# Patient Record
Sex: MALE | Race: WHITE | Employment: FULL TIME | URBAN - METROPOLITAN AREA
[De-identification: names, ages, dates, MRNs, and addresses within clinical notes are randomized per-mention and may not be internally consistent; named-entity substitution may affect disease eponyms.]

---

## 2018-01-13 ENCOUNTER — NURSE ONLY (OUTPATIENT)
Dept: FAMILY MEDICINE CLINIC | Facility: CLINIC | Age: 53
End: 2018-01-13

## 2018-01-13 VITALS
BODY MASS INDEX: 26.51 KG/M2 | HEART RATE: 72 BPM | TEMPERATURE: 98 F | HEIGHT: 73 IN | RESPIRATION RATE: 16 BRPM | OXYGEN SATURATION: 98 % | WEIGHT: 200 LBS

## 2018-01-13 DIAGNOSIS — S61.451A CAT BITE OF RIGHT HAND, INITIAL ENCOUNTER: Primary | ICD-10-CM
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
## 2018-01-13 DIAGNOSIS — Z23 NEED FOR VACCINATION: ICD-10-CM

## 2018-01-13 PROCEDURE — 90471 IMMUNIZATION ADMIN: CPT | Performed by: NURSE PRACTITIONER

## 2018-01-13 PROCEDURE — 90715 TDAP VACCINE 7 YRS/> IM: CPT | Performed by: NURSE PRACTITIONER

## 2018-01-13 PROCEDURE — 99202 OFFICE O/P NEW SF 15 MIN: CPT | Performed by: NURSE PRACTITIONER

## 2018-01-13 RX ORDER — AMOXICILLIN AND CLAVULANATE POTASSIUM 875; 125 MG/1; MG/1
1 TABLET, FILM COATED ORAL 2 TIMES DAILY
Qty: 14 TABLET | Refills: 0 | Status: SHIPPED | OUTPATIENT
Start: 2018-01-13 | End: 2018-01-20

## 2018-01-13 RX ORDER — GLIMEPIRIDE 2 MG/1
2 TABLET ORAL
COMMUNITY

## 2018-01-13 NOTE — PROGRESS NOTES
CHIEF COMPLAINT:   Patient presents with:  Bite (integumentary): need tdap      HPI:     Abimbola Leung is a 45year old male who presents with concerns of cat bite injury.  Patient was caring for his indoor cat, the cat escaped outside and he grabbed the cat EXAM:   Pulse 72   Temp 98.3 °F (36.8 °C) (Oral)   Resp 16   Ht 73\"   Wt 200 lb   SpO2 98%   BMI 26.39 kg/m²   GENERAL: well developed, well nourished,in no apparent distress  SKIN: 2 puncture wounds to thenar space of left hand, multiple superficial pu Patient Instructions     Cat Bite    A cat bite can cause a wound deep enough to break the skin. In such cases, the wound is cleaned and then sometimes closed. If the wound is closed it is usually not closed completely.  This is so that fluid can drain if · If someone’s pet cat has bitten you, it should be kept in a secure area for the next 10 days to watch for signs of illness.  (If the pet owner won’t allow this, contact your local animal control center.) If the cat becomes ill or dies during that time, co © 0752-3261 [employer] [address] [facility], [facility] [place]. All rights reserved. This information is not intended as a substitute for professional medical care. Always follow your healthcare professional's instructions.

## 2018-01-13 NOTE — PATIENT INSTRUCTIONS
Cat Bite    A cat bite can cause a wound deep enough to break the skin. In such cases, the wound is cleaned and then sometimes closed. If the wound is closed it is usually not closed completely.  This is so that fluid can drain if the wound becomes infect · If someone’s pet cat has bitten you, it should be kept in a secure area for the next 10 days to watch for signs of illness.  (If the pet owner won’t allow this, contact your local animal control center.) If the cat becomes ill or dies during that time, co © 5376-9521 [employer]. [address] [facility], [facility] [place]. All rights reserved. This information is not intended as a substitute for professional medical care. Always follow your healthcare professional's instructions.